# Patient Record
Sex: FEMALE | Race: WHITE | URBAN - METROPOLITAN AREA
[De-identification: names, ages, dates, MRNs, and addresses within clinical notes are randomized per-mention and may not be internally consistent; named-entity substitution may affect disease eponyms.]

---

## 2022-08-29 ENCOUNTER — CLINICAL DOCUMENTATION (OUTPATIENT)
Dept: OBGYN CLINIC | Age: 62
End: 2022-08-29

## 2022-09-01 ENCOUNTER — TELEPHONE (OUTPATIENT)
Dept: OBGYN CLINIC | Age: 62
End: 2022-09-01

## 2023-03-01 ENCOUNTER — OFFICE VISIT (OUTPATIENT)
Dept: OBGYN CLINIC | Age: 63
End: 2023-03-01

## 2023-03-01 VITALS
BODY MASS INDEX: 21.12 KG/M2 | DIASTOLIC BLOOD PRESSURE: 70 MMHG | HEIGHT: 62 IN | WEIGHT: 114.8 LBS | SYSTOLIC BLOOD PRESSURE: 110 MMHG

## 2023-03-01 DIAGNOSIS — R87.618 PAP SMEAR ABNORMALITY OF CERVIX/HUMAN PAPILLOMAVIRUS (HPV) POSITIVE: Primary | ICD-10-CM

## 2023-03-01 PROCEDURE — 99213 OFFICE O/P EST LOW 20 MIN: CPT | Performed by: OBSTETRICS & GYNECOLOGY

## 2023-03-01 RX ORDER — ROSUVASTATIN CALCIUM 20 MG/1
TABLET, COATED ORAL
COMMUNITY
Start: 2023-01-13

## 2023-03-01 ASSESSMENT — PATIENT HEALTH QUESTIONNAIRE - PHQ9
SUM OF ALL RESPONSES TO PHQ QUESTIONS 1-9: 0
SUM OF ALL RESPONSES TO PHQ QUESTIONS 1-9: 0
1. LITTLE INTEREST OR PLEASURE IN DOING THINGS: 0
SUM OF ALL RESPONSES TO PHQ9 QUESTIONS 1 & 2: 0
SUM OF ALL RESPONSES TO PHQ QUESTIONS 1-9: 0
2. FEELING DOWN, DEPRESSED OR HOPELESS: 0
SUM OF ALL RESPONSES TO PHQ QUESTIONS 1-9: 0

## 2023-03-01 NOTE — PROGRESS NOTES
Polo Dia is here for repap. No c/o. Patients last menstrual period was none-postmenopausal. She has c/o hot flashes that are worsening. She is also having BRI when sneezing. Pelvic: NL EGBUS,( has long standing clitoromegaly) vagina and cervix. Pap done. Uterus/ovaries wnl  Polo Dia was seen today for abnormal pap smear.     Diagnoses and all orders for this visit:    Pap smear abnormality of cervix/human papillomavirus (HPV) positive  -     PAP IG, Aptima HPV and rfx 16/18,45 ([id number])       Having occ hot flashes, does not want HRT; will try OTC Estroven    F/U 6 months

## 2023-03-14 DIAGNOSIS — F41.9 ANXIETY: Primary | ICD-10-CM

## 2023-03-14 RX ORDER — LORAZEPAM 0.5 MG/1
0.5 TABLET ORAL EVERY 8 HOURS PRN
Qty: 15 TABLET | Refills: 0 | Status: SHIPPED | OUTPATIENT
Start: 2023-03-14 | End: 2023-04-13

## 2023-03-14 NOTE — TELEPHONE ENCOUNTER
----- Message from Amber Robles MD sent at 3/13/2023  9:46 AM EDT -----  Regarding: pap  Her pap ws the xact same as its been, so next one in 6 months  ----- Message -----  From: Haylie Woods Incoming [place] [facility]  Sent: 3/11/2023   4:37 PM EDT  To: Amber Robles MD

## 2023-03-14 NOTE — TELEPHONE ENCOUNTER
Called pt, made aware of results and Dr Louie Cantor recommendations. [employer] message will be sent to her to remind her to call to schedule her appointment (2023). 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 She said that after her last visit, her   suddenly from a heart attack. She was wondering if Dr Elian Vivar could send her in something for her nerves. She is aware that I will get this to him and will call her back once I hear back from her. Voiced full understanding. Routed to [place] to talk with Dr Elian Vivar about Rx.

## 2023-03-14 NOTE — TELEPHONE ENCOUNTER
Orders Placed This Encounter    LORazepam (ATIVAN) 0.5 MG tablet     Sig: Take 1 tablet by mouth every 8 hours as needed for Anxiety for up to 30 days. Max Daily Amount: 1.5 mg     Dispense:  15 tablet     Refill:  0     Called pt, made aware that rx has been sent in. Call back prn. Voiced full understanding.